# Patient Record
Sex: FEMALE | Race: WHITE | Employment: FULL TIME | URBAN - METROPOLITAN AREA
[De-identification: names, ages, dates, MRNs, and addresses within clinical notes are randomized per-mention and may not be internally consistent; named-entity substitution may affect disease eponyms.]

---

## 2019-02-08 ENCOUNTER — HOSPITAL ENCOUNTER (EMERGENCY)
Age: 24
Discharge: HOME | End: 2019-02-08
Attending: EMERGENCY MEDICINE
Payer: COMMERCIAL

## 2019-02-08 ENCOUNTER — APPOINTMENT (OUTPATIENT)
Dept: CT IMAGING | Age: 24
End: 2019-02-08
Payer: COMMERCIAL

## 2019-02-08 VITALS
OXYGEN SATURATION: 97 % | SYSTOLIC BLOOD PRESSURE: 154 MMHG | TEMPERATURE: 98.2 F | BODY MASS INDEX: 28.7 KG/M2 | HEIGHT: 63 IN | HEART RATE: 86 BPM | WEIGHT: 162 LBS | RESPIRATION RATE: 16 BRPM | DIASTOLIC BLOOD PRESSURE: 78 MMHG

## 2019-02-08 DIAGNOSIS — J02.0 STREP PHARYNGITIS: Primary | ICD-10-CM

## 2019-02-08 DIAGNOSIS — J35.1 ENLARGED TONSILS: ICD-10-CM

## 2019-02-08 LAB
ALBUMIN SERPL-MCNC: 4.2 G/DL (ref 3.5–5.2)
ALP BLD-CCNC: 80 U/L (ref 35–104)
ALT SERPL-CCNC: 21 U/L (ref 0–32)
ANION GAP SERPL CALCULATED.3IONS-SCNC: 13 MMOL/L (ref 7–16)
AST SERPL-CCNC: 26 U/L (ref 0–31)
BASOPHILS ABSOLUTE: 0.02 E9/L (ref 0–0.2)
BASOPHILS RELATIVE PERCENT: 0.1 % (ref 0–2)
BILIRUB SERPL-MCNC: 0.4 MG/DL (ref 0–1.2)
BUN BLDV-MCNC: 7 MG/DL (ref 6–20)
CALCIUM SERPL-MCNC: 9 MG/DL (ref 8.6–10.2)
CHLORIDE BLD-SCNC: 104 MMOL/L (ref 98–107)
CO2: 23 MMOL/L (ref 22–29)
CREAT SERPL-MCNC: 0.5 MG/DL (ref 0.5–1)
EOSINOPHILS ABSOLUTE: 0.05 E9/L (ref 0.05–0.5)
EOSINOPHILS RELATIVE PERCENT: 0.4 % (ref 0–6)
GFR AFRICAN AMERICAN: >60
GFR NON-AFRICAN AMERICAN: >60 ML/MIN/1.73
GLUCOSE BLD-MCNC: 102 MG/DL (ref 74–99)
HCG, URINE, POC: NEGATIVE
HCT VFR BLD CALC: 34.7 % (ref 34–48)
HEMOGLOBIN: 11.9 G/DL (ref 11.5–15.5)
IMMATURE GRANULOCYTES #: 0.07 E9/L
IMMATURE GRANULOCYTES %: 0.5 % (ref 0–5)
LYMPHOCYTES ABSOLUTE: 1.75 E9/L (ref 1.5–4)
LYMPHOCYTES RELATIVE PERCENT: 12.5 % (ref 20–42)
Lab: NORMAL
MCH RBC QN AUTO: 29.4 PG (ref 26–35)
MCHC RBC AUTO-ENTMCNC: 34.3 % (ref 32–34.5)
MCV RBC AUTO: 85.7 FL (ref 80–99.9)
MONO TEST: NEGATIVE
MONOCYTES ABSOLUTE: 1.05 E9/L (ref 0.1–0.95)
MONOCYTES RELATIVE PERCENT: 7.5 % (ref 2–12)
NEGATIVE QC PASS/FAIL: NORMAL
NEUTROPHILS ABSOLUTE: 11.01 E9/L (ref 1.8–7.3)
NEUTROPHILS RELATIVE PERCENT: 79 % (ref 43–80)
PDW BLD-RTO: 13.2 FL (ref 11.5–15)
PLATELET # BLD: 223 E9/L (ref 130–450)
PMV BLD AUTO: 9.8 FL (ref 7–12)
POSITIVE QC PASS/FAIL: NORMAL
POTASSIUM SERPL-SCNC: 3.4 MMOL/L (ref 3.5–5)
RBC # BLD: 4.05 E12/L (ref 3.5–5.5)
SODIUM BLD-SCNC: 140 MMOL/L (ref 132–146)
STREP GRP A PCR: POSITIVE
TOTAL PROTEIN: 7.7 G/DL (ref 6.4–8.3)
WBC # BLD: 14 E9/L (ref 4.5–11.5)

## 2019-02-08 PROCEDURE — 80053 COMPREHEN METABOLIC PANEL: CPT

## 2019-02-08 PROCEDURE — 6360000002 HC RX W HCPCS: Performed by: PHYSICIAN ASSISTANT

## 2019-02-08 PROCEDURE — 6360000004 HC RX CONTRAST MEDICATION: Performed by: RADIOLOGY

## 2019-02-08 PROCEDURE — 87880 STREP A ASSAY W/OPTIC: CPT

## 2019-02-08 PROCEDURE — 85025 COMPLETE CBC W/AUTO DIFF WBC: CPT

## 2019-02-08 PROCEDURE — 96374 THER/PROPH/DIAG INJ IV PUSH: CPT

## 2019-02-08 PROCEDURE — 99283 EMERGENCY DEPT VISIT LOW MDM: CPT

## 2019-02-08 PROCEDURE — 86308 HETEROPHILE ANTIBODY SCREEN: CPT

## 2019-02-08 PROCEDURE — 70491 CT SOFT TISSUE NECK W/DYE: CPT

## 2019-02-08 RX ORDER — AMOXICILLIN 500 MG/1
500 CAPSULE ORAL 3 TIMES DAILY
Status: ON HOLD | COMMUNITY
End: 2019-12-23

## 2019-02-08 RX ORDER — METHYLPREDNISOLONE 4 MG/1
TABLET ORAL
Qty: 1 KIT | Status: SHIPPED | OUTPATIENT
Start: 2019-02-08 | End: 2019-02-14

## 2019-02-08 RX ORDER — DEXAMETHASONE SODIUM PHOSPHATE 10 MG/ML
10 INJECTION, SOLUTION INTRAMUSCULAR; INTRAVENOUS ONCE
Status: COMPLETED | OUTPATIENT
Start: 2019-02-08 | End: 2019-02-08

## 2019-02-08 RX ORDER — METHYLPREDNISOLONE 4 MG/1
TABLET ORAL
Qty: 1 KIT | Status: SHIPPED | OUTPATIENT
Start: 2019-02-08 | End: 2019-02-08

## 2019-02-08 RX ORDER — ACETAMINOPHEN 325 MG/1
650 TABLET ORAL EVERY 6 HOURS PRN
COMMUNITY

## 2019-02-08 RX ADMIN — IOPAMIDOL 70 ML: 755 INJECTION, SOLUTION INTRAVENOUS at 17:21

## 2019-02-08 RX ADMIN — DEXAMETHASONE SODIUM PHOSPHATE 10 MG: 10 INJECTION, SOLUTION INTRAMUSCULAR; INTRAVENOUS at 17:06

## 2019-02-08 ASSESSMENT — PAIN DESCRIPTION - PAIN TYPE: TYPE: ACUTE PAIN

## 2019-02-08 ASSESSMENT — PAIN SCALES - GENERAL: PAINLEVEL_OUTOF10: 4

## 2019-06-10 ENCOUNTER — HOSPITAL ENCOUNTER (EMERGENCY)
Age: 24
Discharge: HOME | End: 2019-06-11
Payer: COMMERCIAL

## 2019-06-10 ENCOUNTER — APPOINTMENT (OUTPATIENT)
Dept: ULTRASOUND IMAGING | Age: 24
End: 2019-06-10
Payer: COMMERCIAL

## 2019-06-10 DIAGNOSIS — O00.90 ECTOPIC PREGNANCY, UNSPECIFIED LOCATION, UNSPECIFIED WHETHER INTRAUTERINE PREGNANCY PRESENT: ICD-10-CM

## 2019-06-10 DIAGNOSIS — O20.9 VAGINAL BLEEDING AFFECTING EARLY PREGNANCY: Primary | ICD-10-CM

## 2019-06-10 DIAGNOSIS — O20.0 THREATENED MISCARRIAGE IN EARLY PREGNANCY: ICD-10-CM

## 2019-06-10 PROCEDURE — 76801 OB US < 14 WKS SINGLE FETUS: CPT

## 2019-06-10 PROCEDURE — 99284 EMERGENCY DEPT VISIT MOD MDM: CPT

## 2019-06-10 RX ORDER — 0.9 % SODIUM CHLORIDE 0.9 %
1000 INTRAVENOUS SOLUTION INTRAVENOUS ONCE
Status: COMPLETED | OUTPATIENT
Start: 2019-06-10 | End: 2019-06-11

## 2019-06-10 ASSESSMENT — PAIN DESCRIPTION - PAIN TYPE: TYPE: ACUTE PAIN

## 2019-06-10 ASSESSMENT — PAIN SCALES - GENERAL: PAINLEVEL_OUTOF10: 3

## 2019-06-11 VITALS
HEIGHT: 63 IN | BODY MASS INDEX: 24.8 KG/M2 | TEMPERATURE: 98.2 F | RESPIRATION RATE: 18 BRPM | HEART RATE: 82 BPM | WEIGHT: 140 LBS | OXYGEN SATURATION: 99 % | DIASTOLIC BLOOD PRESSURE: 68 MMHG | SYSTOLIC BLOOD PRESSURE: 126 MMHG

## 2019-06-11 LAB
ABO/RH: NORMAL
ANION GAP SERPL CALCULATED.3IONS-SCNC: 12 MMOL/L (ref 7–16)
BACTERIA: ABNORMAL /HPF
BASOPHILS ABSOLUTE: 0.02 E9/L (ref 0–0.2)
BASOPHILS RELATIVE PERCENT: 0.3 % (ref 0–2)
BILIRUBIN URINE: NEGATIVE
BLOOD, URINE: NEGATIVE
BUN BLDV-MCNC: 12 MG/DL (ref 6–20)
CALCIUM SERPL-MCNC: 8.9 MG/DL (ref 8.6–10.2)
CHLORIDE BLD-SCNC: 103 MMOL/L (ref 98–107)
CLARITY: CLEAR
CO2: 22 MMOL/L (ref 22–29)
COLOR: YELLOW
CREAT SERPL-MCNC: 0.5 MG/DL (ref 0.5–1)
EOSINOPHILS ABSOLUTE: 0.08 E9/L (ref 0.05–0.5)
EOSINOPHILS RELATIVE PERCENT: 1 % (ref 0–6)
EPITHELIAL CELLS, UA: ABNORMAL /HPF
GFR AFRICAN AMERICAN: >60
GFR NON-AFRICAN AMERICAN: >60 ML/MIN/1.73
GLUCOSE BLD-MCNC: 110 MG/DL (ref 74–99)
GLUCOSE URINE: NEGATIVE MG/DL
GONADOTROPIN, CHORIONIC (HCG) QUANT: ABNORMAL MIU/ML
HCT VFR BLD CALC: 33.8 % (ref 34–48)
HEMOGLOBIN: 11.6 G/DL (ref 11.5–15.5)
IMMATURE GRANULOCYTES #: 0.02 E9/L
IMMATURE GRANULOCYTES %: 0.3 % (ref 0–5)
KETONES, URINE: NEGATIVE MG/DL
LEUKOCYTE ESTERASE, URINE: ABNORMAL
LYMPHOCYTES ABSOLUTE: 1.8 E9/L (ref 1.5–4)
LYMPHOCYTES RELATIVE PERCENT: 23.4 % (ref 20–42)
MCH RBC QN AUTO: 29.3 PG (ref 26–35)
MCHC RBC AUTO-ENTMCNC: 34.3 % (ref 32–34.5)
MCV RBC AUTO: 85.4 FL (ref 80–99.9)
MONOCYTES ABSOLUTE: 0.53 E9/L (ref 0.1–0.95)
MONOCYTES RELATIVE PERCENT: 6.9 % (ref 2–12)
NEUTROPHILS ABSOLUTE: 5.25 E9/L (ref 1.8–7.3)
NEUTROPHILS RELATIVE PERCENT: 68.1 % (ref 43–80)
NITRITE, URINE: NEGATIVE
PDW BLD-RTO: 13 FL (ref 11.5–15)
PH UA: 7 (ref 5–9)
PLATELET # BLD: 223 E9/L (ref 130–450)
PMV BLD AUTO: 9.5 FL (ref 7–12)
POTASSIUM REFLEX MAGNESIUM: 4 MMOL/L (ref 3.5–5)
PROTEIN UA: NEGATIVE MG/DL
RBC # BLD: 3.96 E12/L (ref 3.5–5.5)
RBC UA: ABNORMAL /HPF (ref 0–2)
SODIUM BLD-SCNC: 137 MMOL/L (ref 132–146)
SPECIFIC GRAVITY UA: 1.02 (ref 1–1.03)
UROBILINOGEN, URINE: 1 E.U./DL
WBC # BLD: 7.7 E9/L (ref 4.5–11.5)
WBC UA: >20 /HPF (ref 0–5)

## 2019-06-11 PROCEDURE — 86901 BLOOD TYPING SEROLOGIC RH(D): CPT

## 2019-06-11 PROCEDURE — 85025 COMPLETE CBC W/AUTO DIFF WBC: CPT

## 2019-06-11 PROCEDURE — 80048 BASIC METABOLIC PNL TOTAL CA: CPT

## 2019-06-11 PROCEDURE — 81001 URINALYSIS AUTO W/SCOPE: CPT

## 2019-06-11 PROCEDURE — 84702 CHORIONIC GONADOTROPIN TEST: CPT

## 2019-06-11 PROCEDURE — 87591 N.GONORRHOEAE DNA AMP PROB: CPT

## 2019-06-11 PROCEDURE — 36415 COLL VENOUS BLD VENIPUNCTURE: CPT

## 2019-06-11 PROCEDURE — 87491 CHLMYD TRACH DNA AMP PROBE: CPT

## 2019-06-11 PROCEDURE — 2580000003 HC RX 258: Performed by: NURSE PRACTITIONER

## 2019-06-11 PROCEDURE — 86900 BLOOD TYPING SEROLOGIC ABO: CPT

## 2019-06-11 RX ORDER — CALCIUM CARBONATE 300MG(750)
1 TABLET,CHEWABLE ORAL DAILY
Qty: 30 TABLET | Refills: 1 | Status: SHIPPED | OUTPATIENT
Start: 2019-06-11

## 2019-06-11 RX ADMIN — SODIUM CHLORIDE 1000 ML: 9 INJECTION, SOLUTION INTRAVENOUS at 00:31

## 2019-06-11 NOTE — ED PROVIDER NOTES
[facility]      HPI:  6/10/19,   Time: 11:23 PM         Ashley Seth is a 26 y.o. female presenting to the ED for onset this afternoon of vaginal bleeding/spotting, and continuous cramping with unknown dates. She has not seen a provider for this pregnancy stating that that ObGyn she's previously seen is not taking any patients any longer. She is a  3 para 2, LMP unknown possibly mid April. Previous pregnancy was complicated by a month early delivery. Not taking prenatal vitamins this time. Denying illicit drug use, alcohol intake with pregnancy, tobacco or other inhalant use. The complaint has been intermittent, moderate in severity, and worsened by nothing. Denies F/C/N/V/SOB/HA/CP/visual changes or eye pain/fall, injury, or other trauma/LOC or Syncope/Lightheadedness/change in sensation, numbness, tingling, function of neck, facial structures, bilateral upper and lower extremities /change in function of or incontinence of bowel or bladder /hematuria or dysuria. ROS:   Pertinent positives and negatives are stated within HPI, all other systems reviewed and are negative.  --------------------------------------------- PAST HISTORY ---------------------------------------------  Past Medical History:  has a past medical history of Anemia and Mental disorder. Past Surgical History:  has a past surgical history that includes myringotomy () and fracture surgery. Social History:  reports that she has never smoked. She has never used smokeless tobacco. She reports that she does not drink alcohol or use drugs. Family History: family history is not on file. The patients home medications have been reviewed. Allergies:  Other and Polymyxin b    -------------------------------------------------- RESULTS -------------------------------------------------  All laboratory and radiology results have been personally reviewed by myself   LABS:  Results for orders placed or performed during the hospital encounter of 06/10/19   C.trachomatis N.gonorrhoeae DNA, Urine   Result Value Ref Range    Source Urine    CBC Auto Differential   Result Value Ref Range    WBC 7.7 4.5 - 11.5 E9/L    RBC 3.96 3.50 - 5.50 E12/L    Hemoglobin 11.6 11.5 - 15.5 g/dL    Hematocrit 33.8 (L) 34.0 - 48.0 %    MCV 85.4 80.0 - 99.9 fL    MCH 29.3 26.0 - 35.0 pg    MCHC 34.3 32.0 - 34.5 %    RDW 13.0 11.5 - 15.0 fL    Platelets 594 007 - 448 E9/L    MPV 9.5 7.0 - 12.0 fL    Neutrophils % 68.1 43.0 - 80.0 %    Immature Granulocytes % 0.3 0.0 - 5.0 %    Lymphocytes % 23.4 20.0 - 42.0 %    Monocytes % 6.9 2.0 - 12.0 %    Eosinophils % 1.0 0.0 - 6.0 %    Basophils % 0.3 0.0 - 2.0 %    Neutrophils # 5.25 1.80 - 7.30 E9/L    Immature Granulocytes # 0.02 E9/L    Lymphocytes # 1.80 1.50 - 4.00 E9/L    Monocytes # 0.53 0.10 - 0.95 E9/L    Eosinophils # 0.08 0.05 - 0.50 E9/L    Basophils # 0.02 0.00 - 0.20 L1/H   Basic Metabolic Panel w/ Reflex to MG   Result Value Ref Range    Sodium 137 132 - 146 mmol/L    Potassium reflex Magnesium 4.0 3.5 - 5.0 mmol/L    Chloride 103 98 - 107 mmol/L    CO2 22 22 - 29 mmol/L    Anion Gap 12 7 - 16 mmol/L    Glucose 110 (H) 74 - 99 mg/dL    BUN 12 6 - 20 mg/dL    CREATININE 0.5 0.5 - 1.0 mg/dL    GFR Non-African American >60 >=60 mL/min/1.73    GFR African American >60     Calcium 8.9 8.6 - 10.2 mg/dL   Urinalysis, reflex to microscopic   Result Value Ref Range    Color, UA Yellow Straw/Yellow    Clarity, UA Clear Clear    Glucose, Ur Negative Negative mg/dL    Bilirubin Urine Negative Negative    Ketones, Urine Negative Negative mg/dL    Specific Gravity, UA 1.020 1.005 - 1.030    Blood, Urine Negative Negative    pH, UA 7.0 5.0 - 9.0    Protein, UA Negative Negative mg/dL    Urobilinogen, Urine 1.0 <2.0 E.U./dL    Nitrite, Urine Negative Negative    Leukocyte Esterase, Urine MODERATE (A) Negative   HCG, Quantitative, Pregnancy   Result Value Ref Range    hCG Quant 32044.0 (H) <10 mIU/mL   Microscopic Urinalysis   Result Value Ref Range    WBC, UA >20 0 - 5 /HPF    RBC, UA 2-5 0 - 2 /HPF    Epi Cells MANY /HPF    Bacteria, UA MODERATE (A) /HPF   ABO/RH   Result Value Ref Range    ABO/Rh A POS        RADIOLOGY:  Interpreted by Radiologist.  US OB LESS THAN 14 WEEKS SINGLE OR FIRST GESTATION   Final Result   Single, living intrauterine gestation, with estimated gestational age of 9    weeks, 4 days. This report has been electronically signed by Adan Schmitz MD.      SHAILA 1/30/2020    ------------------------- NURSING NOTES AND VITALS REVIEWED ---------------------------   The nursing notes within the ED encounter and vital signs as below have been reviewed. /63   Pulse 90   Temp 98.5 °F (36.9 °C) (Oral)   Resp 16   Ht 5' 3\" (1.6 m)   Wt 140 lb (63.5 kg)   LMP 04/15/2019 (Approximate)   SpO2 100%   BMI 24.80 kg/m²   Oxygen Saturation Interpretation: Normal      ---------------------------------------------------PHYSICAL EXAM--------------------------------------      Constitutional/General: Alert and oriented x3, well appearing, non toxic in NAD  Head: NC/AT  Eye/Ears: PERRL, bilaterally EOMs without pain, no globe tenderness, sclera clear. Bilateral tympanic membranes are normal, no pre-or postauricular nodes, mastoids are normal.  Nose: Bilateral nasal turbinates mildly injected, boggy, clear mucus noted  Mouth: Oropharynx clear, uvula ML normal, handling secretions, MMM, no trismus or TMJ, normal dentition w/o abscess noted  Neck: Supple, full ROM, no ML cervical vertebral bone tenderness throughout, no meningeal signs  Pulmonary: Lungs clear to auscultation bilaterally, no wheezes, rales, or rhonchi. Not in respiratory distress  Cardiovascular:  Regular rate and rhythm, no murmurs, gallops, or rubs. 2+ distal pulses  Abdomen: Soft, non tender, non distended, no organomegaly, no masses, normal active bowel sounds throughout, negative CVAT, no peritoneal signs.   :  No VB noted on DISPOSITION:  Disposition: Discharge to home. Patient condition is good. NOTE: This report was transcribed using voice recognition software. Every effort was made to ensure accuracy; however, inadvertent computerized transcription errors may be present.              ISIAH Hill - CNP  06/11/19 9977

## 2019-06-13 LAB
C. TRACHOMATIS DNA ,URINE: NEGATIVE
N. GONORRHOEAE DNA, URINE: NEGATIVE
SOURCE: NORMAL

## 2019-12-23 ENCOUNTER — HOSPITAL ENCOUNTER (OUTPATIENT)
Age: 24
Discharge: HOME | End: 2019-12-23
Attending: OBSTETRICS & GYNECOLOGY | Admitting: OBSTETRICS & GYNECOLOGY
Payer: COMMERCIAL

## 2019-12-23 VITALS
SYSTOLIC BLOOD PRESSURE: 136 MMHG | HEIGHT: 63 IN | BODY MASS INDEX: 30.12 KG/M2 | DIASTOLIC BLOOD PRESSURE: 67 MMHG | HEART RATE: 101 BPM | RESPIRATION RATE: 16 BRPM | TEMPERATURE: 98.2 F | WEIGHT: 170 LBS

## 2019-12-23 PROBLEM — O36.8190 DECREASED FETAL MOVEMENT AFFECTING MANAGEMENT OF MOTHER, ANTEPARTUM: Status: ACTIVE | Noted: 2019-12-23

## 2019-12-23 PROCEDURE — 76819 FETAL BIOPHYS PROFIL W/O NST: CPT

## 2019-12-23 PROCEDURE — 99211 OFF/OP EST MAY X REQ PHY/QHP: CPT

## 2019-12-23 PROCEDURE — 59025 FETAL NON-STRESS TEST: CPT

## 2019-12-23 RX ORDER — FERROUS SULFATE 325(65) MG
325 TABLET ORAL 2 TIMES DAILY
COMMUNITY
Start: 2019-09-23

## 2020-01-28 ENCOUNTER — HOSPITAL ENCOUNTER (INPATIENT)
Age: 25
LOS: 2 days | Discharge: HOME | DRG: 560 | End: 2020-01-30
Attending: OBSTETRICS & GYNECOLOGY | Admitting: OBSTETRICS & GYNECOLOGY
Payer: COMMERCIAL

## 2020-01-28 ENCOUNTER — ANESTHESIA EVENT (OUTPATIENT)
Dept: LABOR AND DELIVERY | Age: 25
DRG: 560 | End: 2020-01-28
Payer: COMMERCIAL

## 2020-01-28 ENCOUNTER — ANESTHESIA (OUTPATIENT)
Dept: LABOR AND DELIVERY | Age: 25
DRG: 560 | End: 2020-01-28
Payer: COMMERCIAL

## 2020-01-28 PROBLEM — Z3A.39 39 WEEKS GESTATION OF PREGNANCY: Status: ACTIVE | Noted: 2020-01-28

## 2020-01-28 LAB
ABO/RH: NORMAL
ALBUMIN SERPL-MCNC: 3.5 G/DL (ref 3.5–5.2)
ALP BLD-CCNC: 206 U/L (ref 35–104)
ALT SERPL-CCNC: 6 U/L (ref 0–32)
AMPHETAMINE SCREEN, URINE: NOT DETECTED
ANION GAP SERPL CALCULATED.3IONS-SCNC: 15 MMOL/L (ref 7–16)
ANTIBODY SCREEN: NORMAL
AST SERPL-CCNC: 23 U/L (ref 0–31)
BARBITURATE SCREEN URINE: NOT DETECTED
BENZODIAZEPINE SCREEN, URINE: NOT DETECTED
BILIRUB SERPL-MCNC: 0.3 MG/DL (ref 0–1.2)
BUN BLDV-MCNC: 8 MG/DL (ref 6–20)
CALCIUM SERPL-MCNC: 9.4 MG/DL (ref 8.6–10.2)
CANNABINOID SCREEN URINE: NOT DETECTED
CHLORIDE BLD-SCNC: 100 MMOL/L (ref 98–107)
CO2: 21 MMOL/L (ref 22–29)
COCAINE METABOLITE SCREEN URINE: NOT DETECTED
CREAT SERPL-MCNC: 0.5 MG/DL (ref 0.5–1)
FENTANYL SCREEN, URINE: NOT DETECTED
GFR AFRICAN AMERICAN: >60
GFR NON-AFRICAN AMERICAN: >60 ML/MIN/1.73
GLUCOSE BLD-MCNC: 93 MG/DL (ref 74–99)
HCT VFR BLD CALC: 40 % (ref 34–48)
HEMOGLOBIN: 13.1 G/DL (ref 11.5–15.5)
Lab: NORMAL
MCH RBC QN AUTO: 29.6 PG (ref 26–35)
MCHC RBC AUTO-ENTMCNC: 32.8 % (ref 32–34.5)
MCV RBC AUTO: 90.5 FL (ref 80–99.9)
METHADONE SCREEN, URINE: NOT DETECTED
OPIATE SCREEN URINE: NOT DETECTED
OXYCODONE URINE: NOT DETECTED
PDW BLD-RTO: 13.2 FL (ref 11.5–15)
PHENCYCLIDINE SCREEN URINE: NOT DETECTED
PLATELET # BLD: 257 E9/L (ref 130–450)
PMV BLD AUTO: 9.6 FL (ref 7–12)
POTASSIUM SERPL-SCNC: 4.5 MMOL/L (ref 3.5–5)
RBC # BLD: 4.42 E12/L (ref 3.5–5.5)
SODIUM BLD-SCNC: 136 MMOL/L (ref 132–146)
TOTAL PROTEIN: 7.6 G/DL (ref 6.4–8.3)
WBC # BLD: 15.5 E9/L (ref 4.5–11.5)

## 2020-01-28 PROCEDURE — 99233 SBSQ HOSP IP/OBS HIGH 50: CPT | Performed by: PHYSICIAN ASSISTANT

## 2020-01-28 PROCEDURE — 86850 RBC ANTIBODY SCREEN: CPT

## 2020-01-28 PROCEDURE — 1220000001 HC SEMI PRIVATE L&D R&B

## 2020-01-28 PROCEDURE — 80307 DRUG TEST PRSMV CHEM ANLYZR: CPT

## 2020-01-28 PROCEDURE — 3E033VJ INTRODUCTION OF OTHER HORMONE INTO PERIPHERAL VEIN, PERCUTANEOUS APPROACH: ICD-10-PCS | Performed by: OBSTETRICS & GYNECOLOGY

## 2020-01-28 PROCEDURE — 80053 COMPREHEN METABOLIC PANEL: CPT

## 2020-01-28 PROCEDURE — 86901 BLOOD TYPING SEROLOGIC RH(D): CPT

## 2020-01-28 PROCEDURE — 36415 COLL VENOUS BLD VENIPUNCTURE: CPT

## 2020-01-28 PROCEDURE — 6360000002 HC RX W HCPCS: Performed by: OBSTETRICS & GYNECOLOGY

## 2020-01-28 PROCEDURE — 86900 BLOOD TYPING SEROLOGIC ABO: CPT

## 2020-01-28 PROCEDURE — 6370000000 HC RX 637 (ALT 250 FOR IP): Performed by: OBSTETRICS & GYNECOLOGY

## 2020-01-28 PROCEDURE — 85027 COMPLETE CBC AUTOMATED: CPT

## 2020-01-28 PROCEDURE — 2580000003 HC RX 258: Performed by: OBSTETRICS & GYNECOLOGY

## 2020-01-28 RX ORDER — SODIUM CHLORIDE 0.9 % (FLUSH) 0.9 %
10 SYRINGE (ML) INJECTION EVERY 12 HOURS SCHEDULED
Status: DISCONTINUED | OUTPATIENT
Start: 2020-01-28 | End: 2020-01-30 | Stop reason: HOSPADM

## 2020-01-28 RX ORDER — DOCUSATE SODIUM 100 MG/1
100 CAPSULE, LIQUID FILLED ORAL 2 TIMES DAILY
Status: DISCONTINUED | OUTPATIENT
Start: 2020-01-28 | End: 2020-01-30 | Stop reason: HOSPADM

## 2020-01-28 RX ORDER — NALOXONE HYDROCHLORIDE 0.4 MG/ML
0.4 INJECTION, SOLUTION INTRAMUSCULAR; INTRAVENOUS; SUBCUTANEOUS PRN
Status: DISCONTINUED | OUTPATIENT
Start: 2020-01-28 | End: 2020-01-29 | Stop reason: HOSPADM

## 2020-01-28 RX ORDER — ACETAMINOPHEN 325 MG/1
650 TABLET ORAL EVERY 4 HOURS PRN
Status: DISCONTINUED | OUTPATIENT
Start: 2020-01-28 | End: 2020-01-30 | Stop reason: HOSPADM

## 2020-01-28 RX ORDER — ACETAMINOPHEN 650 MG
TABLET, EXTENDED RELEASE ORAL
Status: DISPENSED
Start: 2020-01-28 | End: 2020-01-29

## 2020-01-28 RX ORDER — NALBUPHINE HCL 10 MG/ML
5 AMPUL (ML) INJECTION EVERY 4 HOURS PRN
Status: DISCONTINUED | OUTPATIENT
Start: 2020-01-28 | End: 2020-01-29 | Stop reason: HOSPADM

## 2020-01-28 RX ORDER — SODIUM CHLORIDE 0.9 % (FLUSH) 0.9 %
10 SYRINGE (ML) INJECTION PRN
Status: DISCONTINUED | OUTPATIENT
Start: 2020-01-28 | End: 2020-01-30 | Stop reason: HOSPADM

## 2020-01-28 RX ORDER — SODIUM CHLORIDE, SODIUM LACTATE, POTASSIUM CHLORIDE, CALCIUM CHLORIDE 600; 310; 30; 20 MG/100ML; MG/100ML; MG/100ML; MG/100ML
INJECTION, SOLUTION INTRAVENOUS CONTINUOUS
Status: DISCONTINUED | OUTPATIENT
Start: 2020-01-28 | End: 2020-01-30 | Stop reason: HOSPADM

## 2020-01-28 RX ORDER — LIDOCAINE HYDROCHLORIDE 10 MG/ML
INJECTION, SOLUTION INFILTRATION; PERINEURAL
Status: DISCONTINUED
Start: 2020-01-28 | End: 2020-01-29 | Stop reason: WASHOUT

## 2020-01-28 RX ORDER — PENICILLIN G 3000000 [IU]/50ML
3 INJECTION, SOLUTION INTRAVENOUS EVERY 4 HOURS
Status: DISCONTINUED | OUTPATIENT
Start: 2020-01-28 | End: 2020-01-30 | Stop reason: HOSPADM

## 2020-01-28 RX ORDER — ONDANSETRON 2 MG/ML
4 INJECTION INTRAMUSCULAR; INTRAVENOUS EVERY 6 HOURS PRN
Status: DISCONTINUED | OUTPATIENT
Start: 2020-01-28 | End: 2020-01-29 | Stop reason: HOSPADM

## 2020-01-28 RX ORDER — ONDANSETRON 2 MG/ML
4 INJECTION INTRAMUSCULAR; INTRAVENOUS ONCE
Status: DISCONTINUED | OUTPATIENT
Start: 2020-01-28 | End: 2020-01-30 | Stop reason: HOSPADM

## 2020-01-28 RX ADMIN — PENICILLIN G 3 MILLION UNITS: 3000000 INJECTION, SOLUTION INTRAVENOUS at 22:35

## 2020-01-28 RX ADMIN — SODIUM CHLORIDE, POTASSIUM CHLORIDE, SODIUM LACTATE AND CALCIUM CHLORIDE: 600; 310; 30; 20 INJECTION, SOLUTION INTRAVENOUS at 17:53

## 2020-01-28 RX ADMIN — ACETAMINOPHEN 650 MG: 325 TABLET ORAL at 19:43

## 2020-01-28 RX ADMIN — DEXTROSE MONOHYDRATE 5 MILLION UNITS: 50 INJECTION, SOLUTION INTRAVENOUS at 18:18

## 2020-01-28 RX ADMIN — Medication 1 MILLI-UNITS/MIN: at 18:17

## 2020-01-28 RX ADMIN — SODIUM CHLORIDE, POTASSIUM CHLORIDE, SODIUM LACTATE AND CALCIUM CHLORIDE: 600; 310; 30; 20 INJECTION, SOLUTION INTRAVENOUS at 19:34

## 2020-01-28 ASSESSMENT — PAIN SCALES - GENERAL: PAINLEVEL_OUTOF10: 3

## 2020-01-28 NOTE — PROGRESS NOTES
Called Dr. Boris Hernandez and updated her on lab results, BP's, and FHT's with spontaneous decels and late decels. Induction orders received. Patient notified of plan of care.

## 2020-01-28 NOTE — H&P
Department of Obstetrics and Gynecology  Labor and Delivery   Physician Assistant Obstetrics History and Physical      Patient Name: Ilene Fagan  YOB: 1995   MRN: [id number]    CHIEF COMPLAINT:  decreased fetal movement, nausea    HISTORY OF PRESENT ILLNESS:    The patient is a 27 y.o. female,  A0, Estimated Date of Delivery: 20, EGA: 44 and 3/7 weeks presents to L&D antepartum from office for c/o decreased fetal movement that increased upon arrival to unit. She reported having contractions yesterday. Denies contractions today. Vaginal exam was reported as 2 cms dilated at office. Patient states her cervix has been 3 cms dilated for 3 weeks. Also, c/o nausea and mild headache since this am, has not tried tylenol.  (+) emesis 40 minutes after arrival to floor. BP elevated here. Patient reports she has \"bad anxiety\". Patient denies leakage of fluid, vaginal bleeding, cramping, swelling, RUQ or epigastric pain, visual changes, or urinary symptoms.        Her current obstetrical history is significant for:   1st pregnancy - oligohydramnios  2nd pregnancy- IUGR \"head small\"    Lab Review  GBS: positive    PAST OB HISTORY  OB History    Para Term  AB Living   3 2 1     2   SAB TAB Ectopic Molar Multiple Live Births             2      # Outcome Date GA Lbr [place]/2nd Weight Sex Delivery Anes PTL Lv   3 Current            2 Term 10/18/17 37w3d  6 lb 3.8 oz (2.83 kg) F Vag-Spont EPI N [facility]   1 Para 14 40w3d  6 lb 11 oz (3.033 kg) M Vag-Spont EPI  [facility]       Past Medical History:    Diagnosis Date    Anemia     during pregnancy     Mental disorder     anxiety- lexapro         Past Surgical History:    Procedure Laterality Date    FRACTURE SURGERY      chipped bone right fibula repair    MYRINGOTOMY          Medications Prior to Admission:  Medications Prior to Admission: ferrous sulfate 325 (65 Fe) MG tablet, Take 325 mg by mouth 2 times daily  Prenatal MV-Min-FA-Omega-3 noted  Abdomen: soft, non-tender,   Gravid at 3 and 2/7 weeks   Uterine contractions: none   Uterus soft, non-tender.    Fetal heart rate:  Baseline heart rate 135, variability: moderate, accelerations: present, decelerations: spontaneous decels and lates  Back: no costal vertebral tenderness  Pelvic Exam:     Cervix: 2 cms at office today  Deep Tendon Reflexes:  Knees: not elevated  Extremities: (-) edema  Membranes: Intact         ASSESSMENT AND PLAN:  27 y.o. female,  A0 at 44 and 3/7 weeks of gestation  C/o decreased fetal movement at ob office, sent here    Increased fetal movement since here    Elevated BP    Fetus: initially reassuring, NRFHT noted later    Vomiting upon recheck at 11:40    I discussed above with Dr. Carole Nettles:  EFM  Observation  Cycle BP's  IVF with LR  Zofran 4 mg IV x 1  CBC  CMP    After RN updated Dr. Carole Nettles with labs, elevated BPs, and NRFHT, decision to admit and induce made by Dr. Carole Nettles  Transfer to L&D  NPO  Routine induction orders  Type & Screen  UDS  PCN G for (+) GBS  Pitocin        Electronically signed by Elizabeth Prasad PA-C on 2020 at 11:20 AM

## 2020-01-28 NOTE — PROGRESS NOTES
Patient is a 22-year-old  at 39 weeks 3 days EGA ambulatory to the antepartum unit stating that she was at Dr. Mercedez Galvan office this morning and complained of decreased fetal movement and contractions every 15-30 minutes last night, so she was sent here for monitoring. Patient states that she is now feeling lots of movement, and she denies contractions, vaginal bleeding, or leakage of fluid. To bed in 334, EFM started via toco and ultrasound.

## 2020-01-29 PROCEDURE — 6370000000 HC RX 637 (ALT 250 FOR IP): Performed by: OBSTETRICS & GYNECOLOGY

## 2020-01-29 PROCEDURE — 2500000003 HC RX 250 WO HCPCS: Performed by: ANESTHESIOLOGY

## 2020-01-29 PROCEDURE — 51701 INSERT BLADDER CATHETER: CPT

## 2020-01-29 PROCEDURE — 7200000001 HC VAGINAL DELIVERY

## 2020-01-29 PROCEDURE — 3700000025 EPIDURAL BLOCK: Performed by: ANESTHESIOLOGY

## 2020-01-29 PROCEDURE — 1220000000 HC SEMI PRIVATE OB R&B

## 2020-01-29 PROCEDURE — 6360000002 HC RX W HCPCS: Performed by: OBSTETRICS & GYNECOLOGY

## 2020-01-29 PROCEDURE — 2580000003 HC RX 258: Performed by: OBSTETRICS & GYNECOLOGY

## 2020-01-29 RX ORDER — ACETAMINOPHEN 325 MG/1
650 TABLET ORAL EVERY 4 HOURS PRN
Status: DISCONTINUED | OUTPATIENT
Start: 2020-01-29 | End: 2020-01-30 | Stop reason: HOSPADM

## 2020-01-29 RX ORDER — ESCITALOPRAM OXALATE 10 MG/1
10 TABLET ORAL DAILY
Status: DISCONTINUED | OUTPATIENT
Start: 2020-01-29 | End: 2020-01-30 | Stop reason: HOSPADM

## 2020-01-29 RX ORDER — SODIUM CHLORIDE, SODIUM LACTATE, POTASSIUM CHLORIDE, CALCIUM CHLORIDE 600; 310; 30; 20 MG/100ML; MG/100ML; MG/100ML; MG/100ML
INJECTION, SOLUTION INTRAVENOUS CONTINUOUS
Status: DISCONTINUED | OUTPATIENT
Start: 2020-01-29 | End: 2020-01-30 | Stop reason: HOSPADM

## 2020-01-29 RX ORDER — SODIUM CHLORIDE 0.9 % (FLUSH) 0.9 %
10 SYRINGE (ML) INJECTION PRN
Status: DISCONTINUED | OUTPATIENT
Start: 2020-01-29 | End: 2020-01-30 | Stop reason: HOSPADM

## 2020-01-29 RX ORDER — SODIUM CHLORIDE 0.9 % (FLUSH) 0.9 %
10 SYRINGE (ML) INJECTION EVERY 12 HOURS SCHEDULED
Status: DISCONTINUED | OUTPATIENT
Start: 2020-01-29 | End: 2020-01-30 | Stop reason: HOSPADM

## 2020-01-29 RX ORDER — HYDROCODONE BITARTRATE AND ACETAMINOPHEN 5; 325 MG/1; MG/1
1 TABLET ORAL EVERY 4 HOURS PRN
Status: DISCONTINUED | OUTPATIENT
Start: 2020-01-29 | End: 2020-01-30 | Stop reason: HOSPADM

## 2020-01-29 RX ORDER — LANOLIN 100 %
OINTMENT (GRAM) TOPICAL PRN
Status: DISCONTINUED | OUTPATIENT
Start: 2020-01-29 | End: 2020-01-30 | Stop reason: HOSPADM

## 2020-01-29 RX ORDER — FERROUS SULFATE 325(65) MG
325 TABLET ORAL 2 TIMES DAILY
Status: DISCONTINUED | OUTPATIENT
Start: 2020-01-29 | End: 2020-01-30 | Stop reason: HOSPADM

## 2020-01-29 RX ORDER — HYDROCODONE BITARTRATE AND ACETAMINOPHEN 5; 325 MG/1; MG/1
2 TABLET ORAL EVERY 4 HOURS PRN
Status: DISCONTINUED | OUTPATIENT
Start: 2020-01-29 | End: 2020-01-30 | Stop reason: HOSPADM

## 2020-01-29 RX ORDER — IBUPROFEN 800 MG/1
800 TABLET ORAL EVERY 8 HOURS
Status: DISCONTINUED | OUTPATIENT
Start: 2020-01-30 | End: 2020-01-29

## 2020-01-29 RX ORDER — IBUPROFEN 800 MG/1
800 TABLET ORAL EVERY 8 HOURS PRN
Status: DISCONTINUED | OUTPATIENT
Start: 2020-01-29 | End: 2020-01-30 | Stop reason: HOSPADM

## 2020-01-29 RX ORDER — DOCUSATE SODIUM 100 MG/1
100 CAPSULE, LIQUID FILLED ORAL 2 TIMES DAILY
Status: DISCONTINUED | OUTPATIENT
Start: 2020-01-29 | End: 2020-01-30 | Stop reason: HOSPADM

## 2020-01-29 RX ADMIN — Medication: at 18:51

## 2020-01-29 RX ADMIN — Medication 15 ML/HR: at 01:56

## 2020-01-29 RX ADMIN — DOCUSATE SODIUM 100 MG: 100 CAPSULE, LIQUID FILLED ORAL at 20:20

## 2020-01-29 RX ADMIN — Medication 10 ML: at 20:21

## 2020-01-29 RX ADMIN — DOCUSATE SODIUM 100 MG: 100 CAPSULE, LIQUID FILLED ORAL at 09:20

## 2020-01-29 RX ADMIN — IBUPROFEN 800 MG: 800 TABLET, FILM COATED ORAL at 15:05

## 2020-01-29 RX ADMIN — FERROUS SULFATE TAB 325 MG (65 MG ELEMENTAL FE) 325 MG: 325 (65 FE) TAB at 09:20

## 2020-01-29 RX ADMIN — Medication 10 ML: at 01:56

## 2020-01-29 RX ADMIN — BENZOCAINE AND LEVOMENTHOL: 200; 5 SPRAY TOPICAL at 20:20

## 2020-01-29 RX ADMIN — PENICILLIN G 3 MILLION UNITS: 3000000 INJECTION, SOLUTION INTRAVENOUS at 02:31

## 2020-01-29 RX ADMIN — HYDROCODONE BITARTRATE AND ACETAMINOPHEN 1 TABLET: 5; 325 TABLET ORAL at 09:23

## 2020-01-29 RX ADMIN — ESCITALOPRAM OXALATE 10 MG: 10 TABLET ORAL at 09:21

## 2020-01-29 ASSESSMENT — PAIN SCALES - GENERAL
PAINLEVEL_OUTOF10: 4
PAINLEVEL_OUTOF10: 0
PAINLEVEL_OUTOF10: 3

## 2020-01-29 NOTE — LACTATION NOTE
Experienced mom reports baby latched well after delivery. Encouraged skin to skin and frequent attempts at breast to stimulate milk production. Instructed on normal infant behavior in the first 12-24 hours and importance of stimulating the baby frequently to eat during this time. Reviewed hand expression. Encouraged to feed infant as often and as long as the infant wishes to do so. Instructed on benefits of skin to skin, rooming-in and avoidance of pacifier use until breastfeeding is well established. Educated on making sure infant has an open airway while breastfeeding and skin to skin. Instructed on feeding cues and waking techniques to try. Information given regarding health benefits of colostrum and exclusive breastfeeding. Encouraged to call with any concerns. Mom has a breast pump for home use.

## 2020-01-29 NOTE — ANESTHESIA PRE PROCEDURE
Department of Anesthesiology  Preprocedure Note       Name:  Kanchan Nguyen   Age:  27 y.o.  :  1995                                          MRN:  [id number]         Date:  2020      Surgeon: * No surgeons listed *    Procedure: Labor Analgesia    Medications prior to admission:   Prior to Admission medications    Medication Sig Start Date End Date Taking?  Authorizing Provider   ferrous sulfate 325 (65 Fe) MG tablet Take 325 mg by mouth 2 times daily 19  Yes Historical Provider, MD   Prenatal MV-Min-FA-Omega-3 (PRENATAL GUMMIES/DHA & FA) 0.4-32.5 MG CHEW Take 1 Dose by mouth daily 19  Yes ISIAH Gibson - CNP   acetaminophen (TYLENOL) 325 MG tablet Take 650 mg by mouth every 6 hours as needed for Pain   Yes Historical Provider, MD   escitalopram (LEXAPRO) 5 MG tablet Take 10 mg by mouth daily    Yes Historical Provider, MD       Current medications:    Current Facility-Administered Medications   Medication Dose Route Frequency Provider Last Rate Last Dose    lactated ringers infusion   Intravenous Continuous Mortimer Romney,  mL/hr at 20 1934      ondansetron [facility]) injection 4 mg  4 mg Intravenous Once Mortimer Romney, DO        lactated ringers infusion   Intravenous Continuous Mortimer Romney,  mL/hr at 20 1753      sodium chloride flush 0.9 % injection 10 mL  10 mL Intravenous 2 times per day Mortimer Romney, DO        sodium chloride flush 0.9 % injection 10 mL  10 mL Intravenous PRN Mortimer Romney, DO        docusate sodium (COLACE) capsule 100 mg  100 mg Oral BID Mortimer Romney, DO        oxytocin (PITOCIN) 30 units in 500 mL infusion  1 estuardo-units/min Intravenous Continuous Mortimer Romney, DO 2 mL/hr at 20 1849 2 estuardo-units/min at 20 1849    penicillin G potassium IVPB 3 Million Units  3 Million Units Intravenous Q4H Mortimer Romney, DO        butorphanol (STADOL) injection 2 mg  2 mg Intravenous Q3H PRN Mortimer Romney, DO           Allergies: Allergies   Allergen Reactions    Other Other (See Comments)     polymycin b sulfate, causes redness, inflammation, and irritation    Polymyxin B Swelling       Problem List:    Patient Active Problem List   Diagnosis Code    Oligohydramnios O41.00X0    Pregnancy Z34.90    Decreased fetal movement affecting management of mother, antepartum O40.1    44 weeks gestation of pregnancy Z3A.39    Decreased fetal movements in third trimester O42.0    Nausea/vomiting in pregnancy O21.9       Past Medical History:        Diagnosis Date    Anemia     during pregnancy     Mental disorder     anxiety- lexapro     Postpartum depression        Past Surgical History:        Procedure Laterality Date    FRACTURE SURGERY      chipped bone right fibula repair    MYRINGOTOMY  1999       Social History:    Social History     Tobacco Use    Smoking status: Never Smoker    Smokeless tobacco: Never Used   Substance Use Topics    Alcohol use: No                                Counseling given: Not Answered      Vital Signs (Current):   Vitals:    01/28/20 1727 01/28/20 1751 01/28/20 1845 01/28/20 1915   BP: 131/80 (!) 149/67 132/74 128/67   Pulse: 88 85 88 83   Resp:  18 18 18   Temp:  36.6 °C (97.8 °F)     TempSrc:       Weight:       Height:                                                  BP Readings from Last 3 Encounters:   01/28/20 128/67   12/23/19 136/67   06/11/19 126/68       NPO Status: Time of last liquid consumption: 0830                        Time of last solid consumption: 1030                        Date of last liquid consumption: 01/28/20                        Date of last solid food consumption: 01/28/20    BMI:   Wt Readings from Last 3 Encounters:   01/28/20 185 lb (83.9 kg)   12/23/19 170 lb (77.1 kg)   06/10/19 140 lb (63.5 kg)     Body mass index is 32.77 kg/m².     CBC:   Lab Results   Component Value Date    WBC 15.5 01/28/2020    RBC 4.42 01/28/2020    HGB 13.1 01/28/2020    HCT 40.0

## 2020-01-29 NOTE — ANESTHESIA POSTPROCEDURE EVALUATION
Department of Anesthesiology  Postprocedure Note    Patient: Rico Pan  MRN: [id number]  YOB: 1995  Date of evaluation: 1/29/2020  Time:  9:21 AM     Procedure Summary     Date:  01/29/20 Room / Location:      Anesthesia Start:  0143 Anesthesia Stop:  0530    Procedure:  Labor Analgesia Diagnosis:      Scheduled Providers:   Responsible Provider:  Mike Riley MD    Anesthesia Type:  general, spinal, epidural ASA Status:  2          Anesthesia Type: general, spinal, epidural    Eleanor Phase I:      Eleanor Phase II:      Last vitals: Reviewed and per EMR flowsheets.        Anesthesia Post Evaluation    Patient location during evaluation: bedside  Patient participation: complete - patient participated  Level of consciousness: awake and alert  Airway patency: patent  Nausea & Vomiting: no nausea and no vomiting  Complications: no  Cardiovascular status: hemodynamically stable  Respiratory status: acceptable  Hydration status: euvolemic

## 2020-01-30 VITALS
DIASTOLIC BLOOD PRESSURE: 71 MMHG | TEMPERATURE: 97.7 F | RESPIRATION RATE: 18 BRPM | OXYGEN SATURATION: 99 % | SYSTOLIC BLOOD PRESSURE: 128 MMHG | BODY MASS INDEX: 32.78 KG/M2 | HEART RATE: 81 BPM | HEIGHT: 63 IN | WEIGHT: 185 LBS

## 2020-01-30 LAB
HCT VFR BLD CALC: 34.4 % (ref 34–48)
HEMOGLOBIN: 11.1 G/DL (ref 11.5–15.5)

## 2020-01-30 PROCEDURE — 36415 COLL VENOUS BLD VENIPUNCTURE: CPT

## 2020-01-30 PROCEDURE — 6370000000 HC RX 637 (ALT 250 FOR IP): Performed by: OBSTETRICS & GYNECOLOGY

## 2020-01-30 PROCEDURE — 85014 HEMATOCRIT: CPT

## 2020-01-30 PROCEDURE — 85018 HEMOGLOBIN: CPT

## 2020-01-30 RX ORDER — IBUPROFEN 800 MG/1
800 TABLET ORAL EVERY 8 HOURS PRN
Qty: 120 TABLET | Refills: 1 | Status: SHIPPED | OUTPATIENT
Start: 2020-01-30

## 2020-01-30 RX ADMIN — DOCUSATE SODIUM 100 MG: 100 CAPSULE, LIQUID FILLED ORAL at 07:58

## 2020-01-30 RX ADMIN — ESCITALOPRAM OXALATE 10 MG: 10 TABLET ORAL at 07:58

## 2020-01-30 RX ADMIN — HYDROCODONE BITARTRATE AND ACETAMINOPHEN 1 TABLET: 5; 325 TABLET ORAL at 07:58

## 2020-01-30 RX ADMIN — IBUPROFEN 800 MG: 800 TABLET, FILM COATED ORAL at 13:32

## 2020-01-30 RX ADMIN — IBUPROFEN 800 MG: 800 TABLET, FILM COATED ORAL at 03:27

## 2020-01-30 ASSESSMENT — PAIN SCALES - GENERAL
PAINLEVEL_OUTOF10: 1
PAINLEVEL_OUTOF10: 4
PAINLEVEL_OUTOF10: 4
PAINLEVEL_OUTOF10: 1

## 2020-01-30 ASSESSMENT — PAIN DESCRIPTION - RADICULAR PAIN: RADICULAR_PAIN: ABSENT

## 2020-01-30 NOTE — PROGRESS NOTES
Postpartum Day 1: Vaginal Delivery    The patient feels well. Pain is well controlled with current medications. Baby is feeding via breast.     Objective:        Vitals:    01/30/20 0806   BP: 128/71   Pulse: 81   Resp: 18   Temp: 97.7 °F (36.5 °C)   SpO2: 99%         Lab Results   Component Value Date    WBC 15.5 (H) 01/28/2020    HGB 11.1 (L) 01/30/2020    HCT 34.4 01/30/2020    MCV 90.5 01/28/2020     01/28/2020       General:    alert, appears stated age and cooperative   Lochia:  appropriate   Uterine    firm   DVT Evaluation:  No evidence of DVT seen on physical exam.     Assessment:     Status post Vaginal Delivery. good    Plan:     Discharge home with standard precautions and return to clinic in 4-6 weeks.

## 2020-01-30 NOTE — PROGRESS NOTES
CLINICAL PHARMACY NOTE: MEDS TO [address] Select Patient?: No  Total # of Prescriptions Filled: 1   The following medications were delivered to the patient:  · Ibuprofen 800 mg     Total # of Interventions Completed: 2  Time Spent (min): 15    Additional Documentation:

## 2020-01-30 NOTE — PLAN OF CARE
Problem: Pain - Acute:  Goal: Pain level will decrease  Description  Pain level will decrease  1/30/2020 0143 by Elo Riley RN  Outcome: Met This Shift  1/29/2020 1940 by Carroll Luna RN  Outcome: Met This Shift  Goal: Able to cope with pain  Description  Able to cope with pain  Outcome: Met This Shift     Problem: Pain:  Goal: Pain level will decrease  Description  Pain level will decrease  1/30/2020 0143 by Elo Riley RN  Outcome: Met This Shift  1/29/2020 1940 by Carroll Luna RN  Outcome: Met This Shift     Problem: Mood - Altered:  Goal: Mood stable  Description  Mood stable  Outcome: Met This Shift

## 2020-01-30 NOTE — DISCHARGE SUMMARY
Obstetrical Discharge Form    Gestational Age:39w4d    Antepartum complications: non-reassuring fetal testing    Date of Delivery: 2020      Type of Delivery: vaginal, spontaneous    Delivered By:  Jose Sprague:   Information for the patient's :  Priya Gasca [[id number]]        Anesthesia: Epidural    Intrapartum complications: None    Feeding method: breast    Postpartum complications: none    Discharge Date:     Plan:   Follow up in 6 week(s)

## 2022-05-11 VITALS
TEMPERATURE: 98.8 F | WEIGHT: 160 LBS | HEART RATE: 114 BPM | RESPIRATION RATE: 20 BRPM | SYSTOLIC BLOOD PRESSURE: 145 MMHG | BODY MASS INDEX: 28.35 KG/M2 | OXYGEN SATURATION: 100 % | DIASTOLIC BLOOD PRESSURE: 70 MMHG | HEIGHT: 63 IN

## 2022-05-12 ENCOUNTER — HOSPITAL ENCOUNTER (EMERGENCY)
Age: 27
Discharge: LEFT BEFORE DISCHARGE | End: 2022-05-12

## 2022-05-12 DIAGNOSIS — Z53.21 ELOPED FROM EMERGENCY DEPARTMENT: Primary | ICD-10-CM

## 2022-05-12 NOTE — ED NOTES
Patient called 3 times from waiting room with no response.  Restroom check and patient not available     Leanne Joshua RN  05/12/22 0130

## 2022-05-12 NOTE — ED NOTES
Department of Emergency Medicine  FIRST PROVIDER ELOPEMENT NOTE                 [facility]          5/12/22  1:51 AM EDT    MRN: [id number]    HPI: Al Leahy is a 27 y.o. female who presents to the ED with the following complaint: Breast Pain (Left breast pain - possible Mastitis ( Already being treated for thrush))    (Please refer to [address] [place] [address] for pertinent ROS and PE documentation)  Labs:  No results found for this visit on 05/12/22. Imaging: All Radiology results interpreted by Radiologist unless otherwise noted. No orders to display     ED Course    Medications - No data to display     -------------------------  Disposition    Patient ELOPED from the department prior to completion of evaluation after triage. Results of triage orders that were completed at time of elopement as indicated above were reviewed.     Diagnosis at Time of Elopement: (Based on presenting complaint/available test results):  Breast pain    Electronically signed by Danielito Graham PA-C   DD: 5/12/22       Danielito Graham PA-C  05/12/22 0151

## 2022-05-12 NOTE — ED NOTES
Department of Emergency Medicine  FIRST PROVIDER TRIAGE NOTE             [facility]           5/12/22  1:50 AM EDT    Date of Encounter: 5/12/22   MRN: [id number]      HPI: Alecia Machado is a 27 y.o. female who presents to the ED for Breast Pain (Left breast pain - possible Mastitis ( Already being treated for thrush))     ROS: Negative for vomiting or diarrhea. PE: Gen Appearance/Constitutional: alert     Initial Plan of Care: All treatment areas with department are currently occupied.   Proceed toTreatment Area When Bed Available for ED Attending/MLP to Continue Care    Electronically signed by Candis Chu PA-C   DD: 5/12/22         Candis Chu PA-C  05/12/22 0150

## 2023-04-11 ENCOUNTER — APPOINTMENT (OUTPATIENT)
Dept: ULTRASOUND IMAGING | Age: 28
End: 2023-04-11
Payer: COMMERCIAL

## 2023-04-11 ENCOUNTER — HOSPITAL ENCOUNTER (EMERGENCY)
Age: 28
Discharge: HOME | End: 2023-04-11
Payer: COMMERCIAL

## 2023-04-11 ENCOUNTER — APPOINTMENT (OUTPATIENT)
Dept: GENERAL RADIOLOGY | Age: 28
End: 2023-04-11
Payer: COMMERCIAL

## 2023-04-11 VITALS
BODY MASS INDEX: 31.89 KG/M2 | SYSTOLIC BLOOD PRESSURE: 155 MMHG | HEART RATE: 80 BPM | OXYGEN SATURATION: 100 % | TEMPERATURE: 99.1 F | DIASTOLIC BLOOD PRESSURE: 94 MMHG | WEIGHT: 180 LBS | HEIGHT: 63 IN | RESPIRATION RATE: 16 BRPM

## 2023-04-11 DIAGNOSIS — M79.602 LEFT ARM PAIN: Primary | ICD-10-CM

## 2023-04-11 LAB
ALBUMIN SERPL-MCNC: 4.4 G/DL (ref 3.5–5.2)
ALP SERPL-CCNC: 79 U/L (ref 35–104)
ALT SERPL-CCNC: 30 U/L (ref 0–32)
ANION GAP SERPL CALCULATED.3IONS-SCNC: 11 MMOL/L (ref 7–16)
AST SERPL-CCNC: 21 U/L (ref 0–31)
BASOPHILS # BLD: 0.02 E9/L (ref 0–0.2)
BASOPHILS NFR BLD: 0.2 % (ref 0–2)
BILIRUB SERPL-MCNC: <0.2 MG/DL (ref 0–1.2)
BUN SERPL-MCNC: 11 MG/DL (ref 6–20)
CALCIUM SERPL-MCNC: 10 MG/DL (ref 8.6–10.2)
CHLORIDE SERPL-SCNC: 105 MMOL/L (ref 98–107)
CO2 SERPL-SCNC: 26 MMOL/L (ref 22–29)
CREAT SERPL-MCNC: 0.6 MG/DL (ref 0.5–1)
EBV VCA AB SER QL: NEGATIVE
EKG ATRIAL RATE: 80 BPM
EKG P AXIS: 69 DEGREES
EKG P-R INTERVAL: 142 MS
EKG Q-T INTERVAL: 390 MS
EKG QRS DURATION: 98 MS
EKG QTC CALCULATION (BAZETT): 449 MS
EKG R AXIS: 98 DEGREES
EKG T AXIS: 66 DEGREES
EKG VENTRICULAR RATE: 80 BPM
EOSINOPHIL # BLD: 0.08 E9/L (ref 0.05–0.5)
EOSINOPHIL NFR BLD: 1 % (ref 0–6)
ERYTHROCYTE [DISTWIDTH] IN BLOOD BY AUTOMATED COUNT: 12.8 FL (ref 11.5–15)
GLUCOSE SERPL-MCNC: 111 MG/DL (ref 74–99)
HCG, URINE, POC: NEGATIVE
HCT VFR BLD AUTO: 38 % (ref 34–48)
HGB BLD-MCNC: 12.6 G/DL (ref 11.5–15.5)
IMM GRANULOCYTES # BLD: 0.04 E9/L
IMM GRANULOCYTES NFR BLD: 0.5 % (ref 0–5)
LYMPHOCYTES # BLD: 1.66 E9/L (ref 1.5–4)
LYMPHOCYTES NFR BLD: 20.7 % (ref 20–42)
Lab: NORMAL
MCH RBC QN AUTO: 27.8 PG (ref 26–35)
MCHC RBC AUTO-ENTMCNC: 33.2 % (ref 32–34.5)
MCV RBC AUTO: 83.9 FL (ref 80–99.9)
MONOCYTES # BLD: 0.52 E9/L (ref 0.1–0.95)
MONOCYTES NFR BLD: 6.5 % (ref 2–12)
NEGATIVE QC PASS/FAIL: NORMAL
NEUTROPHILS # BLD: 5.69 E9/L (ref 1.8–7.3)
NEUTS SEG NFR BLD: 71.1 % (ref 43–80)
PLATELET # BLD AUTO: 331 E9/L (ref 130–450)
PMV BLD AUTO: 9.1 FL (ref 7–12)
POSITIVE QC PASS/FAIL: NORMAL
POTASSIUM SERPL-SCNC: 4.4 MMOL/L (ref 3.5–5)
PROT SERPL-MCNC: 8.2 G/DL (ref 6.4–8.3)
RBC # BLD AUTO: 4.53 E12/L (ref 3.5–5.5)
SARS-COV-2 RDRP RESP QL NAA+PROBE: NOT DETECTED
SODIUM SERPL-SCNC: 142 MMOL/L (ref 132–146)
STREP GRP A PCR: NEGATIVE
TROPONIN, HIGH SENSITIVITY: <6 NG/L (ref 0–9)
WBC # BLD: 8 E9/L (ref 4.5–11.5)

## 2023-04-11 PROCEDURE — 93005 ELECTROCARDIOGRAM TRACING: CPT | Performed by: PHYSICIAN ASSISTANT

## 2023-04-11 PROCEDURE — 87880 STREP A ASSAY W/OPTIC: CPT

## 2023-04-11 PROCEDURE — 71046 X-RAY EXAM CHEST 2 VIEWS: CPT

## 2023-04-11 PROCEDURE — 84484 ASSAY OF TROPONIN QUANT: CPT

## 2023-04-11 PROCEDURE — 86308 HETEROPHILE ANTIBODY SCREEN: CPT

## 2023-04-11 PROCEDURE — 80053 COMPREHEN METABOLIC PANEL: CPT

## 2023-04-11 PROCEDURE — 2580000003 HC RX 258: Performed by: PHYSICIAN ASSISTANT

## 2023-04-11 PROCEDURE — 99285 EMERGENCY DEPT VISIT HI MDM: CPT

## 2023-04-11 PROCEDURE — 93010 ELECTROCARDIOGRAM REPORT: CPT | Performed by: INTERNAL MEDICINE

## 2023-04-11 PROCEDURE — 93971 EXTREMITY STUDY: CPT

## 2023-04-11 PROCEDURE — 87635 SARS-COV-2 COVID-19 AMP PRB: CPT

## 2023-04-11 PROCEDURE — 85025 COMPLETE CBC W/AUTO DIFF WBC: CPT

## 2023-04-11 RX ORDER — 0.9 % SODIUM CHLORIDE 0.9 %
1000 INTRAVENOUS SOLUTION INTRAVENOUS ONCE
Status: COMPLETED | OUTPATIENT
Start: 2023-04-11 | End: 2023-04-11

## 2023-04-11 RX ADMIN — SODIUM CHLORIDE 1000 ML: 9 INJECTION, SOLUTION INTRAVENOUS at 11:50

## 2023-04-11 ASSESSMENT — PAIN SCALES - GENERAL: PAINLEVEL_OUTOF10: 6

## 2023-04-11 ASSESSMENT — LIFESTYLE VARIABLES
HOW MANY STANDARD DRINKS CONTAINING ALCOHOL DO YOU HAVE ON A TYPICAL DAY: PATIENT DOES NOT DRINK
HOW OFTEN DO YOU HAVE A DRINK CONTAINING ALCOHOL: NEVER

## 2023-04-11 ASSESSMENT — PAIN - FUNCTIONAL ASSESSMENT: PAIN_FUNCTIONAL_ASSESSMENT: 0-10

## 2023-04-11 NOTE — ED PROVIDER NOTES
made to ensure accuracy; however, inadvertent computerized transcription errors may be present.   END OF ED PROVIDER NOTE       Randall Goodman PA-C  04/11/23 0375